# Patient Record
Sex: FEMALE | Race: WHITE | NOT HISPANIC OR LATINO | Employment: UNEMPLOYED | URBAN - METROPOLITAN AREA
[De-identification: names, ages, dates, MRNs, and addresses within clinical notes are randomized per-mention and may not be internally consistent; named-entity substitution may affect disease eponyms.]

---

## 2021-10-30 ENCOUNTER — HOSPITAL ENCOUNTER (EMERGENCY)
Facility: CLINIC | Age: 46
Discharge: HOME | End: 2021-10-30
Attending: PHYSICIAN ASSISTANT | Admitting: PHYSICIAN ASSISTANT
Payer: OTHER GOVERNMENT

## 2021-10-30 VITALS
HEIGHT: 68 IN | DIASTOLIC BLOOD PRESSURE: 89 MMHG | HEART RATE: 89 BPM | OXYGEN SATURATION: 98 % | SYSTOLIC BLOOD PRESSURE: 144 MMHG | WEIGHT: 220 LBS | TEMPERATURE: 97.9 F | RESPIRATION RATE: 16 BRPM | BODY MASS INDEX: 33.34 KG/M2

## 2021-10-30 DIAGNOSIS — R33.9 URINARY RETENTION: ICD-10-CM

## 2021-10-30 LAB
ALBUMIN UR-MCNC: 30 MG/DL
APPEARANCE UR: CLEAR
BILIRUB UR QL STRIP: NEGATIVE
COLOR UR AUTO: ABNORMAL
GLUCOSE UR STRIP-MCNC: NEGATIVE MG/DL
HGB UR QL STRIP: ABNORMAL
KETONES UR STRIP-MCNC: NEGATIVE MG/DL
LEUKOCYTE ESTERASE UR QL STRIP: NEGATIVE
MUCOUS THREADS #/AREA URNS LPF: PRESENT /LPF
NITRATE UR QL: NEGATIVE
PH UR STRIP: 5.5 [PH] (ref 5–7)
RBC URINE: 3 /HPF
SP GR UR STRIP: 1.01 (ref 1–1.03)
SQUAMOUS EPITHELIAL: <1 /HPF
UROBILINOGEN UR STRIP-MCNC: NORMAL MG/DL
WBC URINE: 1 /HPF

## 2021-10-30 PROCEDURE — 99284 EMERGENCY DEPT VISIT MOD MDM: CPT | Mod: 25

## 2021-10-30 PROCEDURE — 96374 THER/PROPH/DIAG INJ IV PUSH: CPT

## 2021-10-30 PROCEDURE — 250N000011 HC RX IP 250 OP 636: Performed by: PHYSICIAN ASSISTANT

## 2021-10-30 PROCEDURE — 250N000013 HC RX MED GY IP 250 OP 250 PS 637: Performed by: PHYSICIAN ASSISTANT

## 2021-10-30 PROCEDURE — 81003 URINALYSIS AUTO W/O SCOPE: CPT | Performed by: PHYSICIAN ASSISTANT

## 2021-10-30 RX ORDER — TAMSULOSIN HYDROCHLORIDE 0.4 MG/1
0.4 CAPSULE ORAL ONCE
Status: COMPLETED | OUTPATIENT
Start: 2021-10-30 | End: 2021-10-30

## 2021-10-30 RX ORDER — CHLORHEXIDINE GLUCONATE ORAL RINSE 1.2 MG/ML
SOLUTION DENTAL
COMMUNITY
Start: 2021-06-09

## 2021-10-30 RX ORDER — KETOROLAC TROMETHAMINE 15 MG/ML
15 INJECTION, SOLUTION INTRAMUSCULAR; INTRAVENOUS ONCE
Status: COMPLETED | OUTPATIENT
Start: 2021-10-30 | End: 2021-10-30

## 2021-10-30 RX ORDER — MELOXICAM 7.5 MG/1
TABLET ORAL
COMMUNITY
Start: 2020-09-20

## 2021-10-30 RX ORDER — OXYCODONE HYDROCHLORIDE 5 MG/1
5 TABLET ORAL ONCE
Status: COMPLETED | OUTPATIENT
Start: 2021-10-30 | End: 2021-10-30

## 2021-10-30 RX ORDER — OXYCODONE HYDROCHLORIDE 10 MG/1
10 TABLET ORAL
COMMUNITY
Start: 2021-09-21 | End: 2021-10-30

## 2021-10-30 RX ORDER — ZOLPIDEM TARTRATE 10 MG/1
10 TABLET ORAL
COMMUNITY
Start: 2021-03-30

## 2021-10-30 RX ORDER — OXYCODONE AND ACETAMINOPHEN 5; 325 MG/1; MG/1
TABLET ORAL
COMMUNITY
Start: 2021-06-09

## 2021-10-30 RX ORDER — NITROFURANTOIN 25; 75 MG/1; MG/1
CAPSULE ORAL
COMMUNITY
Start: 2021-07-02

## 2021-10-30 RX ORDER — TAMSULOSIN HYDROCHLORIDE 0.4 MG/1
0.4 CAPSULE ORAL DAILY
Qty: 10 CAPSULE | Refills: 0 | Status: SHIPPED | OUTPATIENT
Start: 2021-10-30 | End: 2021-11-09

## 2021-10-30 RX ORDER — OXYCODONE HYDROCHLORIDE 5 MG/1
5 TABLET ORAL EVERY 6 HOURS PRN
Qty: 10 TABLET | Refills: 0 | Status: SHIPPED | OUTPATIENT
Start: 2021-10-30

## 2021-10-30 RX ORDER — PREDNISONE 20 MG/1
TABLET ORAL
COMMUNITY
Start: 2021-05-26

## 2021-10-30 RX ORDER — FLUTICASONE PROPIONATE 50 MCG
2 SPRAY, SUSPENSION (ML) NASAL
COMMUNITY
Start: 2021-05-26 | End: 2022-05-26

## 2021-10-30 RX ORDER — CETIRIZINE HYDROCHLORIDE 10 MG/1
10 TABLET ORAL
COMMUNITY

## 2021-10-30 RX ADMIN — KETOROLAC TROMETHAMINE 15 MG: 15 INJECTION, SOLUTION INTRAMUSCULAR; INTRAVENOUS at 11:34

## 2021-10-30 RX ADMIN — OXYCODONE HYDROCHLORIDE 5 MG: 5 TABLET ORAL at 11:35

## 2021-10-30 RX ADMIN — TAMSULOSIN HYDROCHLORIDE 0.4 MG: 0.4 CAPSULE ORAL at 11:35

## 2021-10-30 ASSESSMENT — ENCOUNTER SYMPTOMS
DYSURIA: 0
ABDOMINAL PAIN: 1
HEMATURIA: 0
DIFFICULTY URINATING: 1

## 2021-10-30 ASSESSMENT — MIFFLIN-ST. JEOR: SCORE: 1686.41

## 2021-10-30 NOTE — ED PROVIDER NOTES
"  History   Chief Complaint:  Urinary Retention       The history is provided by the patient and the EMS personnel.      Mesha Knapp is a 45 year old female with history of urinary retention, s/p hysterectomy in June, who presents with urinary retention and lower abdominal pain that she noted earlier this morning while on a flight. She describes the pain as similar to an episode of urinary retention in September during which she was sent home with a [place] catheter. She last voided at 0500 this morning and denies any dysuria or hematuria at that time. She was unable to void a few hours later. Per EMS, she was slightly hypotensive in route, her blood sugar was 124 and she received a dose of fentanyl.    Review of Systems   Gastrointestinal: Positive for abdominal pain.   Genitourinary: Positive for difficulty urinating. Negative for dysuria and hematuria.   All other systems reviewed and are negative.      Allergies:  Claritin-D    Medications:  Zyrtec  Ambien  Mobic  Macrobid  Percocet  Deltasone    Past Medical History:     Anxiety  Epilepsy      Past Surgical History:    Appendectomy  Bilateral mammaplasty reduction  Hysterectomy    Family History:    Mother: stroke  Father: hypertension    Social History:  Presents to the ED with her  via EMS.  She is from [place] but presents here on her way to visit her son in [place].    Physical Exam     Patient Vitals for the past 24 hrs:   BP Temp Temp src Pulse Resp SpO2 Height Weight   10/30/21 1015 (!) 144/89 97.9  F (36.6  C) Oral 89 16 98 % 1.727 m (5' 8\") 99.8 kg (220 lb)       Physical Exam  General: Appears uncomfortable, moaning in pain.   Head:  Scalp is atraumatic.  Eyes:   Normal conjunctiva.   ENT:                                      Ears:  The external ears are normal.   Nose:  The external nose is normal.  Throat:  The oropharynx is normal. Mucus membranes are moist.                 Neck:  Normal range of motion.   CV:  Normal rate. No " murmur. 2+ radial pulses  Resp:  Breath sounds are clear bilaterally. Non-labored, no retractions or accessory muscle use.  GI:  Abdomen is soft, no distension, suprapubic tenderness. After Tijer[place] catheter placed, no abdominal tenderness, rebound or guarding.   MS:  Normal range of motion. No acute deformities.   Skin:  Warm and dry. No rash.   Neuro:  Alert. Strength and sensation grossly intact.   Psych:  Awake. Alert.  Appropriate interactions.     Emergency Department Course     Laboratory:  Labs Ordered and Resulted from Time of ED Arrival to Time of ED Departure   ROUTINE UA WITH MICROSCOPIC REFLEX TO CULTURE - Abnormal       Result Value    Color Urine Light Yellow      Appearance Urine Clear      Glucose Urine Negative      Bilirubin Urine Negative      Ketones Urine Negative      Specific Gravity Urine 1.010      Blood Urine Trace (*)     pH Urine 5.5      Protein Albumin Urine 30  (*)     Urobilinogen Urine Normal      Nitrite Urine Negative      Leukocyte Esterase Urine Negative      Mucus Urine Present (*)     RBC Urine 3 (*)     WBC Urine 1      Squamous Epithelials Urine <1          Emergency Department Course:  Reviewed:  I reviewed nursing notes, vitals and past medical history    Assessments:  1014 I obtained history and examined the patient as noted above.   1045 I rechecked the patient and explained UA findings..   1151 I rechecked and updated the patient who was feeling improved and ready for discharge.    Interventions:  1134 Toradol 15 mg IV  1135 Roxicodone 5 mg PO  1135 Flomax 0.4 mg PO    Disposition:  The patient was discharged to home.     Impression & Plan     CMS Diagnoses: None    Medical Decision Making:  Mesha Knapp is a 45 year old female with a medical history including urinary retention and hysterectomy presents to the emergency department with urinary retention.  She presents with suprapubic discomfort noting pain feels similar to when she had a Tijer[place] catheter placed in September  due to urinary retention.  Tijer[place] catheter placed and patient felt improved  Repeat abdominal examination benign without significant tenderness.  Urine shows no sign of infection.  She had a CT scan in September without evidence of kidney stone, doubt this given history and presentation.  The cause of the acute urinary retention is unclear at this point.  Patient is from out of town and will follow up with urology when she returns. Plan to discharge home with Flomax. Recommended Ibuprofgen as needed for bladder spasm and short course of oxycodone prescribed for break through apin.  She agrees with plan and return precautions discussed.  All questions and concerns addressed prior to discharge home.      Diagnosis:    ICD-10-CM    1. Urinary retention  R33.9        Discharge Medications:  New Prescriptions    OXYCODONE (ROXICODONE) 5 MG TABLET    Take 1 tablet (5 mg) by mouth every 6 hours as needed for pain    TAMSULOSIN (FLOMAX) 0.4 MG CAPSULE    Take 1 capsule (0.4 mg) by mouth daily for 10 doses       Scribe Disclosure:  Falguni VELASQUEZ, am serving as a scribe at 10:14 AM on 10/30/2021 to document services personally performed by Asia Lees PA-C based on my observations and the provider's statements to me.          Asia Lees PA-C  10/30/21 2917

## 2021-10-30 NOTE — DISCHARGE INSTRUCTIONS
Take ibuprofen 600 mg 3x per day with food. This will provide both pain control and fight against inflammation.   Oxycodone for break through pain.   Flomax as prescribed.   Close follow up with urology in 1 weeks.   Return for fevers, increasing pain, or any other concerning symptoms develop.

## 2021-10-30 NOTE — ED NOTES
Bed: ED20  Expected date:   Expected time:   Means of arrival:   Comments:  Iesha 514 Urinary Retention 45 f